# Patient Record
Sex: MALE | Race: WHITE | NOT HISPANIC OR LATINO | URBAN - METROPOLITAN AREA
[De-identification: names, ages, dates, MRNs, and addresses within clinical notes are randomized per-mention and may not be internally consistent; named-entity substitution may affect disease eponyms.]

---
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
## 2025-05-21 ENCOUNTER — APPOINTMENT (OUTPATIENT)
Dept: RADIOLOGY | Facility: MEDICAL CENTER | Age: 20
DRG: 700 | End: 2025-05-21
Attending: EMERGENCY MEDICINE
Payer: COMMERCIAL

## 2025-05-21 ENCOUNTER — OFFICE VISIT (OUTPATIENT)
Dept: URGENT CARE | Facility: PHYSICIAN GROUP | Age: 20
End: 2025-05-21
Payer: COMMERCIAL

## 2025-05-21 ENCOUNTER — HOSPITAL ENCOUNTER (INPATIENT)
Facility: MEDICAL CENTER | Age: 20
LOS: 2 days | DRG: 700 | End: 2025-05-23
Attending: EMERGENCY MEDICINE | Admitting: STUDENT IN AN ORGANIZED HEALTH CARE EDUCATION/TRAINING PROGRAM
Payer: COMMERCIAL

## 2025-05-21 VITALS
DIASTOLIC BLOOD PRESSURE: 70 MMHG | HEIGHT: 71 IN | TEMPERATURE: 99 F | OXYGEN SATURATION: 96 % | WEIGHT: 142.2 LBS | HEART RATE: 72 BPM | SYSTOLIC BLOOD PRESSURE: 118 MMHG | BODY MASS INDEX: 19.91 KG/M2 | RESPIRATION RATE: 14 BRPM

## 2025-05-21 DIAGNOSIS — T14.90XA BLUNT TRAUMA: ICD-10-CM

## 2025-05-21 DIAGNOSIS — R10.9 ACUTE LEFT FLANK PAIN: ICD-10-CM

## 2025-05-21 DIAGNOSIS — R31.0 GROSS HEMATURIA: Primary | ICD-10-CM
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
## 2025-05-21 DIAGNOSIS — M54.9 BACK PAIN DUE TO INJURY: ICD-10-CM
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
## 2025-05-21 DIAGNOSIS — R10.84 GENERALIZED ABDOMINAL PAIN: ICD-10-CM

## 2025-05-21 PROBLEM — Z53.09 CONTRAINDICATION TO DEEP VEIN THROMBOSIS (DVT) PROPHYLAXIS: Status: ACTIVE | Noted: 2025-05-21

## 2025-05-21 PROBLEM — S37.032A LACERATION OF LEFT KIDNEY: Status: ACTIVE | Noted: 2025-05-21

## 2025-05-21 LAB
ALBUMIN SERPL BCP-MCNC: 4.8 G/DL (ref 3.2–4.9)
ALBUMIN/GLOB SERPL: 1.5 G/DL
ALP SERPL-CCNC: 63 U/L (ref 30–99)
ALT SERPL-CCNC: 19 U/L (ref 2–50)
ANION GAP SERPL CALC-SCNC: 12 MMOL/L (ref 7–16)
APPEARANCE UR: CLEAR
APPEARANCE UR: NORMAL
AST SERPL-CCNC: 28 U/L (ref 12–45)
BACTERIA #/AREA URNS HPF: ABNORMAL /HPF
BASOPHILS # BLD AUTO: 0.3 % (ref 0–1.8)
BASOPHILS # BLD: 0.05 K/UL (ref 0–0.12)
BILIRUB SERPL-MCNC: 0.8 MG/DL (ref 0.1–1.5)
BILIRUB UR QL STRIP.AUTO: NEGATIVE
BILIRUB UR STRIP-MCNC: NORMAL MG/DL
BUN SERPL-MCNC: 14 MG/DL (ref 8–22)
CALCIUM ALBUM COR SERPL-MCNC: 9.4 MG/DL (ref 8.5–10.5)
CALCIUM SERPL-MCNC: 10 MG/DL (ref 8.5–10.5)
CASTS URNS QL MICRO: ABNORMAL /LPF (ref 0–2)
CHLORIDE SERPL-SCNC: 104 MMOL/L (ref 96–112)
CO2 SERPL-SCNC: 23 MMOL/L (ref 20–33)
COLOR UR AUTO: NORMAL
COLOR UR: ABNORMAL
CREAT SERPL-MCNC: 0.99 MG/DL (ref 0.5–1.4)
EOSINOPHIL # BLD AUTO: 0.01 K/UL (ref 0–0.51)
EOSINOPHIL NFR BLD: 0.1 % (ref 0–6.9)
EPITHELIAL CELLS 1715: ABNORMAL /HPF (ref 0–5)
ERYTHROCYTE [DISTWIDTH] IN BLOOD BY AUTOMATED COUNT: 36.9 FL (ref 35.9–50)
GFR SERPLBLD CREATININE-BSD FMLA CKD-EPI: 112 ML/MIN/1.73 M 2
GLOBULIN SER CALC-MCNC: 3.2 G/DL (ref 1.9–3.5)
GLUCOSE SERPL-MCNC: 105 MG/DL (ref 65–99)
GLUCOSE UR STRIP.AUTO-MCNC: NEGATIVE MG/DL
GLUCOSE UR STRIP.AUTO-MCNC: NORMAL MG/DL
HCT VFR BLD AUTO: 45.5 % (ref 42–52)
HGB BLD-MCNC: 16.1 G/DL (ref 14–18)
IMM GRANULOCYTES # BLD AUTO: 0.06 K/UL (ref 0–0.11)
IMM GRANULOCYTES NFR BLD AUTO: 0.4 % (ref 0–0.9)
KETONES UR STRIP.AUTO-MCNC: ABNORMAL MG/DL
KETONES UR STRIP.AUTO-MCNC: NORMAL MG/DL
LEUKOCYTE ESTERASE UR QL STRIP.AUTO: NEGATIVE
LEUKOCYTE ESTERASE UR QL STRIP.AUTO: NORMAL
LIPASE SERPL-CCNC: 21 U/L (ref 11–82)
LYMPHOCYTES # BLD AUTO: 0.97 K/UL (ref 1–4.8)
LYMPHOCYTES NFR BLD: 5.9 % (ref 22–41)
MCH RBC QN AUTO: 29.5 PG (ref 27–33)
MCHC RBC AUTO-ENTMCNC: 35.4 G/DL (ref 32.3–36.5)
MCV RBC AUTO: 83.3 FL (ref 81.4–97.8)
MICRO URNS: ABNORMAL
MONOCYTES # BLD AUTO: 0.67 K/UL (ref 0–0.85)
MONOCYTES NFR BLD AUTO: 4.1 % (ref 0–13.4)
NEUTROPHILS # BLD AUTO: 14.73 K/UL (ref 1.82–7.42)
NEUTROPHILS NFR BLD: 89.2 % (ref 44–72)
NITRITE UR QL STRIP.AUTO: NEGATIVE
NITRITE UR QL STRIP.AUTO: NORMAL
NRBC # BLD AUTO: 0 K/UL
NRBC BLD-RTO: 0 /100 WBC (ref 0–0.2)
PH UR STRIP.AUTO: 7 [PH] (ref 5–8)
PH UR STRIP.AUTO: NORMAL [PH] (ref 5–8)
PLATELET # BLD AUTO: 250 K/UL (ref 164–446)
PMV BLD AUTO: 8.8 FL (ref 9–12.9)
POTASSIUM SERPL-SCNC: 4.3 MMOL/L (ref 3.6–5.5)
PROT SERPL-MCNC: 8 G/DL (ref 6–8.2)
PROT UR QL STRIP: 30 MG/DL
PROT UR QL STRIP: NORMAL
RBC # BLD AUTO: 5.46 M/UL (ref 4.7–6.1)
RBC # URNS HPF: >100 /HPF (ref 0–2)
RBC UR QL AUTO: ABNORMAL
RBC UR QL AUTO: NORMAL
SODIUM SERPL-SCNC: 139 MMOL/L (ref 135–145)
SP GR UR STRIP.AUTO: >1.045
SP GR UR STRIP.AUTO: NORMAL
UROBILINOGEN UR STRIP-MCNC: NORMAL MG/DL
UROBILINOGEN UR STRIP.AUTO-MCNC: 1 EU/DL
WBC # BLD AUTO: 16.5 K/UL (ref 4.8–10.8)
WBC #/AREA URNS HPF: ABNORMAL /HPF

## 2025-05-21 PROCEDURE — 71260 CT THORAX DX C+: CPT

## 2025-05-21 PROCEDURE — 99285 EMERGENCY DEPT VISIT HI MDM: CPT

## 2025-05-21 PROCEDURE — 72125 CT NECK SPINE W/O DYE: CPT

## 2025-05-21 PROCEDURE — A9270 NON-COVERED ITEM OR SERVICE: HCPCS | Performed by: NURSE PRACTITIONER

## 2025-05-21 PROCEDURE — 3074F SYST BP LT 130 MM HG: CPT | Performed by: PHYSICIAN ASSISTANT

## 2025-05-21 PROCEDURE — 74176 CT ABD & PELVIS W/O CONTRAST: CPT

## 2025-05-21 PROCEDURE — 770001 HCHG ROOM/CARE - MED/SURG/GYN PRIV*

## 2025-05-21 PROCEDURE — 81002 URINALYSIS NONAUTO W/O SCOPE: CPT | Performed by: PHYSICIAN ASSISTANT

## 2025-05-21 PROCEDURE — 36415 COLL VENOUS BLD VENIPUNCTURE: CPT

## 2025-05-21 PROCEDURE — 80053 COMPREHEN METABOLIC PANEL: CPT

## 2025-05-21 PROCEDURE — 72131 CT LUMBAR SPINE W/O DYE: CPT

## 2025-05-21 PROCEDURE — 81001 URINALYSIS AUTO W/SCOPE: CPT

## 2025-05-21 PROCEDURE — 85025 COMPLETE CBC W/AUTO DIFF WBC: CPT

## 2025-05-21 PROCEDURE — 99214 OFFICE O/P EST MOD 30 MIN: CPT | Performed by: PHYSICIAN ASSISTANT

## 2025-05-21 PROCEDURE — 70450 CT HEAD/BRAIN W/O DYE: CPT

## 2025-05-21 PROCEDURE — 72128 CT CHEST SPINE W/O DYE: CPT

## 2025-05-21 PROCEDURE — 3078F DIAST BP <80 MM HG: CPT | Performed by: PHYSICIAN ASSISTANT

## 2025-05-21 PROCEDURE — 700102 HCHG RX REV CODE 250 W/ 637 OVERRIDE(OP): Performed by: NURSE PRACTITIONER

## 2025-05-21 PROCEDURE — 83690 ASSAY OF LIPASE: CPT

## 2025-05-21 PROCEDURE — 700117 HCHG RX CONTRAST REV CODE 255: Performed by: EMERGENCY MEDICINE

## 2025-05-21 PROCEDURE — 700105 HCHG RX REV CODE 258: Performed by: NURSE PRACTITIONER

## 2025-05-21 RX ORDER — PROCHLORPERAZINE EDISYLATE 5 MG/ML
5-10 INJECTION INTRAMUSCULAR; INTRAVENOUS EVERY 4 HOURS PRN
Status: DISCONTINUED | OUTPATIENT
Start: 2025-05-21 | End: 2025-05-23 | Stop reason: HOSPADM

## 2025-05-21 RX ORDER — AMOXICILLIN 250 MG
1 CAPSULE ORAL
Status: DISCONTINUED | OUTPATIENT
Start: 2025-05-21 | End: 2025-05-23 | Stop reason: HOSPADM

## 2025-05-21 RX ORDER — ACETAMINOPHEN 500 MG
1000 TABLET ORAL EVERY 6 HOURS PRN
Status: DISCONTINUED | OUTPATIENT
Start: 2025-05-26 | End: 2025-05-23 | Stop reason: HOSPADM

## 2025-05-21 RX ORDER — SODIUM CHLORIDE, SODIUM LACTATE, POTASSIUM CHLORIDE, CALCIUM CHLORIDE 600; 310; 30; 20 MG/100ML; MG/100ML; MG/100ML; MG/100ML
INJECTION, SOLUTION INTRAVENOUS CONTINUOUS
Status: DISCONTINUED | OUTPATIENT
Start: 2025-05-21 | End: 2025-05-23 | Stop reason: HOSPADM

## 2025-05-21 RX ORDER — ONDANSETRON 4 MG/1
4 TABLET, ORALLY DISINTEGRATING ORAL EVERY 4 HOURS PRN
Status: DISCONTINUED | OUTPATIENT
Start: 2025-05-21 | End: 2025-05-23 | Stop reason: HOSPADM

## 2025-05-21 RX ORDER — BISACODYL 10 MG
10 SUPPOSITORY, RECTAL RECTAL
Status: DISCONTINUED | OUTPATIENT
Start: 2025-05-21 | End: 2025-05-23 | Stop reason: HOSPADM

## 2025-05-21 RX ORDER — DOCUSATE SODIUM 100 MG/1
100 CAPSULE, LIQUID FILLED ORAL 2 TIMES DAILY
Status: DISCONTINUED | OUTPATIENT
Start: 2025-05-21 | End: 2025-05-22

## 2025-05-21 RX ORDER — SODIUM PHOSPHATE,MONO-DIBASIC 19G-7G/118
1 ENEMA (ML) RECTAL
Status: DISCONTINUED | OUTPATIENT
Start: 2025-05-21 | End: 2025-05-23 | Stop reason: HOSPADM

## 2025-05-21 RX ORDER — OXYCODONE HYDROCHLORIDE 5 MG/1
5 TABLET ORAL EVERY 4 HOURS PRN
Refills: 0 | Status: DISCONTINUED | OUTPATIENT
Start: 2025-05-21 | End: 2025-05-23 | Stop reason: HOSPADM

## 2025-05-21 RX ORDER — ACETAMINOPHEN 500 MG
1000 TABLET ORAL EVERY 6 HOURS
Status: DISCONTINUED | OUTPATIENT
Start: 2025-05-21 | End: 2025-05-23 | Stop reason: HOSPADM

## 2025-05-21 RX ORDER — POLYETHYLENE GLYCOL 3350 17 G/17G
1 POWDER, FOR SOLUTION ORAL 2 TIMES DAILY
Status: DISCONTINUED | OUTPATIENT
Start: 2025-05-21 | End: 2025-05-23 | Stop reason: HOSPADM

## 2025-05-21 RX ORDER — AMOXICILLIN 250 MG
1 CAPSULE ORAL NIGHTLY
Status: DISCONTINUED | OUTPATIENT
Start: 2025-05-21 | End: 2025-05-23 | Stop reason: HOSPADM

## 2025-05-21 RX ORDER — ONDANSETRON 2 MG/ML
4 INJECTION INTRAMUSCULAR; INTRAVENOUS EVERY 4 HOURS PRN
Status: DISCONTINUED | OUTPATIENT
Start: 2025-05-21 | End: 2025-05-23 | Stop reason: HOSPADM

## 2025-05-21 RX ORDER — GABAPENTIN 100 MG/1
100 CAPSULE ORAL EVERY 8 HOURS
Status: DISCONTINUED | OUTPATIENT
Start: 2025-05-21 | End: 2025-05-22

## 2025-05-21 RX ORDER — SENNOSIDES 8.6 MG
650 CAPSULE ORAL EVERY 6 HOURS PRN
COMMUNITY

## 2025-05-21 RX ADMIN — IOHEXOL 90 ML: 350 INJECTION, SOLUTION INTRAVENOUS at 18:45

## 2025-05-21 RX ADMIN — GABAPENTIN 100 MG: 100 CAPSULE ORAL at 21:08

## 2025-05-21 RX ADMIN — ACETAMINOPHEN 1000 MG: 500 TABLET ORAL at 20:55

## 2025-05-21 RX ADMIN — SODIUM CHLORIDE, POTASSIUM CHLORIDE, SODIUM LACTATE AND CALCIUM CHLORIDE: 600; 310; 30; 20 INJECTION, SOLUTION INTRAVENOUS at 20:55

## 2025-05-21 ASSESSMENT — PAIN DESCRIPTION - PAIN TYPE
TYPE: ACUTE PAIN
TYPE: ACUTE PAIN

## 2025-05-21 ASSESSMENT — COPD QUESTIONNAIRES
DURING THE PAST 4 WEEKS HOW MUCH DID YOU FEEL SHORT OF BREATH: NONE/LITTLE OF THE TIME
HAVE YOU SMOKED AT LEAST 100 CIGARETTES IN YOUR ENTIRE LIFE: YES
COPD SCREENING SCORE: 2
DO YOU EVER COUGH UP ANY MUCUS OR PHLEGM?: NO/ONLY WITH OCCASIONAL COLDS OR INFECTIONS

## 2025-05-21 ASSESSMENT — LIFESTYLE VARIABLES: DO YOU DRINK ALCOHOL: NO

## 2025-05-21 NOTE — PROGRESS NOTES
"Subjective     Paolo Meyer is a 19 y.o. male who presents with Fall (Earlier today patient was snowboarding fell and injured his lower back and has pain down his back and abdominal.)    PMH:  has a past medical history of FAS (fetal alcohol syndrome).    He has no past medical history of ASTHMA or Diabetes.  MEDS: Current Medications[1]  ALLERGIES: Allergies[2]  SURGHX: Past Surgical History[3]  SOCHX:  reports that he has never smoked. He has never used smokeless tobacco. He reports current alcohol use. He reports current drug use. Drug: Marijuana.  FH: Reviewed with patient, not pertinent to this visit.           Patient presents with complaint of left lower back, left hip and left lower abdomen pain after a significant fall while snowboarding today.  Patient states he was wearing a helmet, went off a jump and came down to a very hard landing on his left hip and back.  Patient states he was quite dazed at the time but did not lose consciousness.  Patient states he was able to snowboard down and did a few more rounds but decided to go home due to the pain.  Patient did drive himself home, patient's father has driven him to the urgent care.  Patient has gross hematuria and abdominal pain.  No other complaints.      Fall  Associated symptoms include headaches and hematuria.       Review of Systems   Genitourinary:  Positive for hematuria.   Musculoskeletal:  Positive for back pain and myalgias.   Neurological:  Positive for headaches.   All other systems reviewed and are negative.             Objective     /70   Pulse 72   Temp 37.2 °C (99 °F) (Temporal)   Resp 14   Ht 1.803 m (5' 11\")   Wt 64.5 kg (142 lb 3.2 oz)   SpO2 96%   BMI 19.83 kg/m²      Physical Exam  Vitals and nursing note reviewed.   Constitutional:       General: He is not in acute distress.     Appearance: Normal appearance. He is well-developed. He is not ill-appearing or toxic-appearing.   HENT:      Head: Normocephalic and " atraumatic.      Right Ear: Tympanic membrane normal.      Left Ear: Tympanic membrane normal.      Nose: Nose normal.      Mouth/Throat:      Lips: Pink.      Mouth: Mucous membranes are moist.      Pharynx: Oropharynx is clear. Uvula midline.   Eyes:      Extraocular Movements: Extraocular movements intact.      Conjunctiva/sclera: Conjunctivae normal.      Pupils: Pupils are equal, round, and reactive to light.   Cardiovascular:      Rate and Rhythm: Normal rate and regular rhythm.      Pulses: Normal pulses.      Heart sounds: Normal heart sounds.   Pulmonary:      Effort: Pulmonary effort is normal.      Breath sounds: Normal breath sounds.   Abdominal:      General: Bowel sounds are normal.      Palpations: Abdomen is soft.      Tenderness: There is abdominal tenderness. There is left CVA tenderness. There is no guarding or rebound.      Hernia: No hernia is present.   Musculoskeletal:         General: Normal range of motion.      Cervical back: Normal range of motion and neck supple.   Skin:     General: Skin is warm and dry.      Capillary Refill: Capillary refill takes less than 2 seconds.          Neurological:      General: No focal deficit present.      Mental Status: He is alert and oriented to person, place, and time.      Cranial Nerves: No cranial nerve deficit.      Motor: Motor function is intact.      Coordination: Coordination is intact.      Gait: Gait normal.   Psychiatric:         Mood and Affect: Mood normal.                                  Assessment & Plan  Gross hematuria    Orders:    POCT Urinalysis    Fall involving snowboard as cause of accidental injury         Generalized abdominal pain         Acute left flank pain         Back pain due to injury            Urine is gross blood.     PT requires evaluation and treatment at a facility that can provide a higher level of care due to acuity of illness/complaint.     PT father will drive him to the ER for further  evaluation.    Differential diagnosis, supportive care, and indications for immediate follow-up discussed with patient.  Instructed to return to clinic or nearest emergency department for any change in condition, further concerns, or worsening of symptoms.    I personally reviewed prior external notes and test results pertinent to today's visit.  I have independently reviewed and interpreted all diagnostics ordered during this urgent care visit.    I have contacted [facility] , spoken with RN with pt information and pending arrival to Emergency Room.       Pt and/or family verbalized understanding of diagnosis and follow up instructions and was offered informational handout on diagnosis.  PT discharged.     Please note that this dictation was created using voice recognition software. I have made every reasonable attempt to correct obvious errors, but I expect that there may be errors of grammar and possibly content that I did not discover before finalizing the note.              [1]   Current Outpatient Medications:     methocarbamol (ROBAXIN) 750 MG Tab, Take 1 Tablet by mouth every 8 hours as needed (muscle pain)., Disp: 20 Tablet, Rfl: 0    acetaminophen (TYLENOL 8 HOUR) 650 MG CR tablet, Take 650 mg by mouth every 6 hours as needed for Mild Pain or Moderate Pain., Disp: , Rfl:   [2] No Known Allergies  [3] History reviewed. No pertinent surgical history.

## 2025-05-22 LAB
ANION GAP SERPL CALC-SCNC: 12 MMOL/L (ref 7–16)
BASOPHILS # BLD AUTO: 0.4 % (ref 0–1.8)
BASOPHILS # BLD: 0.04 K/UL (ref 0–0.12)
BUN SERPL-MCNC: 14 MG/DL (ref 8–22)
CALCIUM SERPL-MCNC: 9.4 MG/DL (ref 8.5–10.5)
CHLORIDE SERPL-SCNC: 102 MMOL/L (ref 96–112)
CO2 SERPL-SCNC: 24 MMOL/L (ref 20–33)
CREAT SERPL-MCNC: 0.99 MG/DL (ref 0.5–1.4)
EOSINOPHIL # BLD AUTO: 0.07 K/UL (ref 0–0.51)
EOSINOPHIL NFR BLD: 0.6 % (ref 0–6.9)
ERYTHROCYTE [DISTWIDTH] IN BLOOD BY AUTOMATED COUNT: 38.3 FL (ref 35.9–50)
GFR SERPLBLD CREATININE-BSD FMLA CKD-EPI: 112 ML/MIN/1.73 M 2
GLUCOSE BLD STRIP.AUTO-MCNC: 91 MG/DL (ref 65–99)
GLUCOSE BLD STRIP.AUTO-MCNC: 95 MG/DL (ref 65–99)
GLUCOSE SERPL-MCNC: 99 MG/DL (ref 65–99)
HCT VFR BLD AUTO: 43.2 % (ref 42–52)
HGB BLD-MCNC: 14.8 G/DL (ref 14–18)
IMM GRANULOCYTES # BLD AUTO: 0.03 K/UL (ref 0–0.11)
IMM GRANULOCYTES NFR BLD AUTO: 0.3 % (ref 0–0.9)
LYMPHOCYTES # BLD AUTO: 2.37 K/UL (ref 1–4.8)
LYMPHOCYTES NFR BLD: 21.9 % (ref 22–41)
MAGNESIUM SERPL-MCNC: 2.4 MG/DL (ref 1.5–2.5)
MCH RBC QN AUTO: 29.3 PG (ref 27–33)
MCHC RBC AUTO-ENTMCNC: 34.3 G/DL (ref 32.3–36.5)
MCV RBC AUTO: 85.5 FL (ref 81.4–97.8)
MONOCYTES # BLD AUTO: 0.95 K/UL (ref 0–0.85)
MONOCYTES NFR BLD AUTO: 8.8 % (ref 0–13.4)
NEUTROPHILS # BLD AUTO: 7.35 K/UL (ref 1.82–7.42)
NEUTROPHILS NFR BLD: 68 % (ref 44–72)
NRBC # BLD AUTO: 0 K/UL
NRBC BLD-RTO: 0 /100 WBC (ref 0–0.2)
PHOSPHATE SERPL-MCNC: 4 MG/DL (ref 2.5–4.5)
PLATELET # BLD AUTO: 244 K/UL (ref 164–446)
PMV BLD AUTO: 9.3 FL (ref 9–12.9)
POTASSIUM SERPL-SCNC: 3.6 MMOL/L (ref 3.6–5.5)
RBC # BLD AUTO: 5.05 M/UL (ref 4.7–6.1)
SODIUM SERPL-SCNC: 138 MMOL/L (ref 135–145)
WBC # BLD AUTO: 10.8 K/UL (ref 4.8–10.8)

## 2025-05-22 PROCEDURE — 97161 PT EVAL LOW COMPLEX 20 MIN: CPT

## 2025-05-22 PROCEDURE — 85025 COMPLETE CBC W/AUTO DIFF WBC: CPT

## 2025-05-22 PROCEDURE — 84100 ASSAY OF PHOSPHORUS: CPT

## 2025-05-22 PROCEDURE — 83735 ASSAY OF MAGNESIUM: CPT

## 2025-05-22 PROCEDURE — A9270 NON-COVERED ITEM OR SERVICE: HCPCS | Performed by: NURSE PRACTITIONER

## 2025-05-22 PROCEDURE — 700102 HCHG RX REV CODE 250 W/ 637 OVERRIDE(OP): Performed by: NURSE PRACTITIONER

## 2025-05-22 PROCEDURE — 82962 GLUCOSE BLOOD TEST: CPT

## 2025-05-22 PROCEDURE — 97535 SELF CARE MNGMENT TRAINING: CPT

## 2025-05-22 PROCEDURE — 51798 US URINE CAPACITY MEASURE: CPT

## 2025-05-22 PROCEDURE — 80048 BASIC METABOLIC PNL TOTAL CA: CPT

## 2025-05-22 PROCEDURE — 770001 HCHG ROOM/CARE - MED/SURG/GYN PRIV*

## 2025-05-22 RX ORDER — METAXALONE 800 MG/1
800 TABLET ORAL 3 TIMES DAILY
Status: DISCONTINUED | OUTPATIENT
Start: 2025-05-22 | End: 2025-05-23 | Stop reason: HOSPADM

## 2025-05-22 RX ADMIN — ACETAMINOPHEN 1000 MG: 500 TABLET ORAL at 05:53

## 2025-05-22 RX ADMIN — ACETAMINOPHEN 1000 MG: 500 TABLET ORAL at 11:35

## 2025-05-22 RX ADMIN — ACETAMINOPHEN 1000 MG: 500 TABLET ORAL at 00:14

## 2025-05-22 RX ADMIN — METAXALONE 800 MG: 800 TABLET ORAL at 11:35

## 2025-05-22 RX ADMIN — METAXALONE 800 MG: 800 TABLET ORAL at 18:01

## 2025-05-22 RX ADMIN — OXYCODONE 5 MG: 5 TABLET ORAL at 18:11

## 2025-05-22 SDOH — ECONOMIC STABILITY: TRANSPORTATION INSECURITY
IN THE PAST 12 MONTHS, HAS THE LACK OF TRANSPORTATION KEPT YOU FROM MEDICAL APPOINTMENTS OR FROM GETTING MEDICATIONS?: NO

## 2025-05-22 SDOH — ECONOMIC STABILITY: TRANSPORTATION INSECURITY
IN THE PAST 12 MONTHS, HAS LACK OF RELIABLE TRANSPORTATION KEPT YOU FROM MEDICAL APPOINTMENTS, MEETINGS, WORK OR FROM GETTING THINGS NEEDED FOR DAILY LIVING?: NO

## 2025-05-22 ASSESSMENT — COGNITIVE AND FUNCTIONAL STATUS - GENERAL
DAILY ACTIVITIY SCORE: 24
MOBILITY SCORE: 18
MOVING TO AND FROM BED TO CHAIR: A LITTLE
TURNING FROM BACK TO SIDE WHILE IN FLAT BAD: A LITTLE
MOVING FROM LYING ON BACK TO SITTING ON SIDE OF FLAT BED: A LITTLE
SUGGESTED CMS G CODE MODIFIER MOBILITY: CH
WALKING IN HOSPITAL ROOM: A LITTLE
SUGGESTED CMS G CODE MODIFIER DAILY ACTIVITY: CH
CLIMB 3 TO 5 STEPS WITH RAILING: A LITTLE
MOBILITY SCORE: 24
STANDING UP FROM CHAIR USING ARMS: A LITTLE
SUGGESTED CMS G CODE MODIFIER MOBILITY: CK

## 2025-05-22 ASSESSMENT — PATIENT HEALTH QUESTIONNAIRE - PHQ9
2. FEELING DOWN, DEPRESSED, IRRITABLE, OR HOPELESS: NOT AT ALL
SUM OF ALL RESPONSES TO PHQ9 QUESTIONS 1 AND 2: 0
1. LITTLE INTEREST OR PLEASURE IN DOING THINGS: NOT AT ALL

## 2025-05-22 ASSESSMENT — ENCOUNTER SYMPTOMS
CARDIOVASCULAR NEGATIVE: 1
VOMITING: 0
RESPIRATORY NEGATIVE: 1
NAUSEA: 0
GASTROINTESTINAL NEGATIVE: 1

## 2025-05-22 ASSESSMENT — LIFESTYLE VARIABLES
HAVE YOU EVER FELT YOU SHOULD CUT DOWN ON YOUR DRINKING: NO
TOTAL SCORE: 0
ON A TYPICAL DAY WHEN YOU DRINK ALCOHOL HOW MANY DRINKS DO YOU HAVE: 0
HAVE PEOPLE ANNOYED YOU BY CRITICIZING YOUR DRINKING: NO
AVERAGE NUMBER OF DAYS PER WEEK YOU HAVE A DRINK CONTAINING ALCOHOL: 0
ALCOHOL_USE: NO
DOES PATIENT WANT TO STOP DRINKING: NO
TOTAL SCORE: 0
EVER HAD A DRINK FIRST THING IN THE MORNING TO STEADY YOUR NERVES TO GET RID OF A HANGOVER: NO
TOTAL SCORE: 0
CONSUMPTION TOTAL: NEGATIVE
EVER FELT BAD OR GUILTY ABOUT YOUR DRINKING: NO
HOW MANY TIMES IN THE PAST YEAR HAVE YOU HAD 5 OR MORE DRINKS IN A DAY: 0

## 2025-05-22 ASSESSMENT — SOCIAL DETERMINANTS OF HEALTH (SDOH)
WITHIN THE PAST 12 MONTHS, YOU WORRIED THAT YOUR FOOD WOULD RUN OUT BEFORE YOU GOT THE MONEY TO BUY MORE: NEVER TRUE
IN THE PAST 12 MONTHS, HAS THE ELECTRIC, GAS, OIL, OR WATER COMPANY THREATENED TO SHUT OFF SERVICE IN YOUR HOME?: NO
WITHIN THE PAST 12 MONTHS, THE FOOD YOU BOUGHT JUST DIDN'T LAST AND YOU DIDN'T HAVE MONEY TO GET MORE: NEVER TRUE
IN THE PAST 12 MONTHS, HAS THE ELECTRIC, GAS, OIL, OR WATER COMPANY THREATENED TO SHUT OFF SERVICE IN YOUR HOME?: NO
WITHIN THE PAST 12 MONTHS, THE FOOD YOU BOUGHT JUST DIDN'T LAST AND YOU DIDN'T HAVE MONEY TO GET MORE: NEVER TRUE
WITHIN THE LAST YEAR, HAVE YOU BEEN AFRAID OF YOUR PARTNER OR EX-PARTNER?: NO
WITHIN THE LAST YEAR, HAVE TO BEEN RAPED OR FORCED TO HAVE ANY KIND OF SEXUAL ACTIVITY BY YOUR PARTNER OR EX-PARTNER?: NO
WITHIN THE LAST YEAR, HAVE YOU BEEN HUMILIATED OR EMOTIONALLY ABUSED IN OTHER WAYS BY YOUR PARTNER OR EX-PARTNER?: NO
WITHIN THE LAST YEAR, HAVE YOU BEEN KICKED, HIT, SLAPPED, OR OTHERWISE PHYSICALLY HURT BY YOUR PARTNER OR EX-PARTNER?: NO

## 2025-05-22 ASSESSMENT — GAIT ASSESSMENTS
DEVIATION: BRADYKINETIC
DISTANCE (FEET): 500
GAIT LEVEL OF ASSIST: SUPERVISED

## 2025-05-22 ASSESSMENT — PAIN DESCRIPTION - PAIN TYPE
TYPE: ACUTE PAIN

## 2025-05-22 ASSESSMENT — PAIN SCALES - WONG BAKER: WONGBAKER_NUMERICALRESPONSE: HURTS JUST A LITTLE BIT

## 2025-05-22 NOTE — ED NOTES
Pt sitting up in bed eating food provided by his parents, per admission orders, and tolerating well.

## 2025-05-22 NOTE — ED TRIAGE NOTES
"Chief Complaint   Patient presents with    T-5000     Was snowboarding and landed on L side, endorsing increased flank pain radiating to LLQ. Reports blood started in urine after fall, described as dark red. Denies dysuria. Redness noticed to L hip area but no visible bruising to flank or abdomen. Endorsing worsening pain w/ palpation.      /78   Pulse 73   Temp 36.9 °C (98.4 °F) (Temporal)   Resp 14   Ht 1.803 m (5' 11\")   Wt 64.6 kg (142 lb 6.7 oz)   SpO2 100%   BMI 19.86 kg/m²     Pt ambulatory to triage w/ above complaint.   Denies any medical history. Abdominal protocol ordered. Charge RN notified pt to blue 13H.     "

## 2025-05-22 NOTE — ASSESSMENT & PLAN NOTE
VTE prophylaxis initially contraindicated secondary to elevated bleeding risk.  5/23 Trauma surveillance venous duplex ultrasonography ordered.

## 2025-05-22 NOTE — CARE PLAN
The patient is Stable - Low risk of patient condition declining or worsening    Shift Goals  Clinical Goals: q4 bladder scan, monitor urine output, pain control  Patient Goals: rest, comfort  Family Goals: not present      Problem: Pain - Standard  Goal: Alleviation of pain or a reduction in pain to the patient’s comfort goal  Outcome: Progressing     Problem: Knowledge Deficit - Standard  Goal: Patient and family/care givers will demonstrate understanding of plan of care, disease process/condition, diagnostic tests and medications  Outcome: Progressing     Problem: Fluid Volume:  Goal: Will maintain balanced intake and output  Outcome: Progressing

## 2025-05-22 NOTE — ED PROVIDER NOTES
"ED Provider Note    CHIEF COMPLAINT  Chief Complaint   Patient presents with    T-5000     Was snowboarding and landed on L side, endorsing increased flank pain radiating to LLQ. Reports blood started in urine after fall, described as dark red. Denies dysuria. Redness noticed to L hip area but no visible bruising to flank or abdomen. Endorsing worsening pain w/ palpation.        EXTERNAL RECORDS REVIEWED  Outpatient Notes urgent care note from earlier today    HPI/ROS  LIMITATION TO HISTORY   Select: : None  OUTSIDE HISTORIAN(S):  none    Paolo Meyer is a 19 y.o. male who presents to the emergency department after a snowboarding accident.  States that he was at [employer] snowboarding earlier today.  Went off one of their jumps and believes he got roughly 15 feet of air but then lost his balance then ultimately landed directly on his back/left side.  Was helmeted.  Denies full loss conscious but said he was \"dazed \".  Continue to ride for the rest of the day but now this afternoon has had progressively worsening left back and flank pain.    PAST MEDICAL HISTORY   has a past medical history of FAS (fetal alcohol syndrome).    SURGICAL HISTORY  patient denies any surgical history    FAMILY HISTORY  Family History   Problem Relation Age of Onset    No Known Problems Mother     No Known Problems Father     Cancer Maternal Grandmother         uterine       SOCIAL HISTORY  Social History     Tobacco Use    Smoking status: Never    Smokeless tobacco: Never   Vaping Use    Vaping status: Every Day    Substances: Nicotine, THC   Substance and Sexual Activity    Alcohol use: Yes     Comment: occ    Drug use: Yes     Types: Marijuana    Sexual activity: Never       CURRENT MEDICATIONS  Home Medications       Reviewed by Asiya Huynh R.N. (Registered Nurse) on 05/21/25 at 1715  Med List Status: Partial     Medication Last Dose Status   acetaminophen (TYLENOL) 120 MG Suppos  Active   ibuprofen (MOTRIN) 200 MG Tab  " "Active                  Audit from Redirected Encounters    **Home medications have not yet been reviewed for this encounter**         ALLERGIES  Allergies[1]    PHYSICAL EXAM  VITAL SIGNS: /69   Pulse 68   Temp 36.9 °C (98.4 °F) (Temporal)   Resp 16   Ht 1.803 m (5' 11\")   Wt 64.6 kg (142 lb 6.7 oz)   SpO2 97%   BMI 19.86 kg/m²      Pulse ox interpretation: I interpret this pulse ox as normal.  Constitutional: Alert in no apparent distress.  HENT: No signs of trauma, Bilateral external ears normal, Nose normal.   Eyes: Pupils are equal and reactive  Neck: Normal range of motion, No tenderness, Supple  Cardiovascular: Regular rate and rhythm, no murmurs.   Thorax & Lungs: Normal breath sounds, No respiratory distress, mild left lateral chest wall tenderness.  Abdomen: Bowel sounds normal, Soft, left flank tenderness.  No bruising.  Skin: Warm, Dry, No erythema, No rash.   Back: No bony tenderness  Musculoskeletal: Good range of motion in all major joints. No tenderness to palpation or major deformities noted.   Neurologic: Alert , Normal motor function, Normal sensory function, No focal deficits noted.   Psychiatric: Affect normal, Judgment normal, Mood normal.         EKG/LABS  Results for orders placed or performed during the hospital encounter of 05/21/25   CBC WITH DIFFERENTIAL    Collection Time: 05/21/25  5:38 PM   Result Value Ref Range    WBC 16.5 (H) 4.8 - 10.8 K/uL    RBC 5.46 4.70 - 6.10 M/uL    Hemoglobin 16.1 14.0 - 18.0 g/dL    Hematocrit 45.5 42.0 - 52.0 %    MCV 83.3 81.4 - 97.8 fL    MCH 29.5 27.0 - 33.0 pg    MCHC 35.4 32.3 - 36.5 g/dL    RDW 36.9 35.9 - 50.0 fL    Platelet Count 250 164 - 446 K/uL    MPV 8.8 (L) 9.0 - 12.9 fL    Neutrophils-Polys 89.20 (H) 44.00 - 72.00 %    Lymphocytes 5.90 (L) 22.00 - 41.00 %    Monocytes 4.10 0.00 - 13.40 %    Eosinophils 0.10 0.00 - 6.90 %    Basophils 0.30 0.00 - 1.80 %    Immature Granulocytes 0.40 0.00 - 0.90 %    Nucleated RBC 0.00 0.00 - 0.20 " /100 WBC    Neutrophils (Absolute) 14.73 (H) 1.82 - 7.42 K/uL    Lymphs (Absolute) 0.97 (L) 1.00 - 4.80 K/uL    Monos (Absolute) 0.67 0.00 - 0.85 K/uL    Eos (Absolute) 0.01 0.00 - 0.51 K/uL    Baso (Absolute) 0.05 0.00 - 0.12 K/uL    Immature Granulocytes (abs) 0.06 0.00 - 0.11 K/uL    NRBC (Absolute) 0.00 K/uL   COMP METABOLIC PANEL    Collection Time: 05/21/25  5:38 PM   Result Value Ref Range    Sodium 139 135 - 145 mmol/L    Potassium 4.3 3.6 - 5.5 mmol/L    Chloride 104 96 - 112 mmol/L    Co2 23 20 - 33 mmol/L    Anion Gap 12.0 7.0 - 16.0    Glucose 105 (H) 65 - 99 mg/dL    Bun 14 8 - 22 mg/dL    Creatinine 0.99 0.50 - 1.40 mg/dL    Calcium 10.0 8.5 - 10.5 mg/dL    Correct Calcium 9.4 8.5 - 10.5 mg/dL    AST(SGOT) 28 12 - 45 U/L    ALT(SGPT) 19 2 - 50 U/L    Alkaline Phosphatase 63 30 - 99 U/L    Total Bilirubin 0.8 0.1 - 1.5 mg/dL    Albumin 4.8 3.2 - 4.9 g/dL    Total Protein 8.0 6.0 - 8.2 g/dL    Globulin 3.2 1.9 - 3.5 g/dL    A-G Ratio 1.5 g/dL   LIPASE    Collection Time: 05/21/25  5:38 PM   Result Value Ref Range    Lipase 21 11 - 82 U/L   ESTIMATED GFR    Collection Time: 05/21/25  5:38 PM   Result Value Ref Range    GFR (CKD-EPI) 112 >60 mL/min/1.73 m 2   URINALYSIS    Collection Time: 05/21/25  7:22 PM    Specimen: Urine   Result Value Ref Range    Micro Urine Req Microscopic        I have independently interpreted this EKG    RADIOLOGY/PROCEDURES   I have independently interpreted the diagnostic imaging associated with this visit and am waiting the final reading from the radiologist.   My preliminary interpretation is as follows: No large intracranial hemorrhage    Radiologist interpretation:  CT-ABDOMEN-PELVIS W/O   Final Result      1.  No evidence of bladder injury.   2.  LEFT kidney contusion, not apparent on prior imaging.  Minimal capsular contrast suggests laceration although renal parenchyma is difficult to assess on delayed phase study.   3.  Unremarkable RIGHT kidney.          CT-CHEST,ABDOMEN,PELVIS WITH   Final Result      No acute posttraumatic imaging findings in the chest, abdomen or pelvis.      CT-LSPINE W/O PLUS RECONS   Final Result      CT of the lumbar spine without contrast within normal limits.      CT-TSPINE W/O PLUS RECONS   Final Result      CT of the thoracic spine without contrast within normal limits.      CT-CSPINE WITHOUT PLUS RECONS   Final Result      CT of the cervical spine without contrast within normal limits.      CT-HEAD W/O   Final Result      No acute intracranial abnormality.                      COURSE & MEDICAL DECISION MAKING    ASSESSMENT, COURSE AND PLAN  Care Narrative: Patient presenting after moderate fall while snowboarding.  Roughly 10 to 15 foot fall.  Will complete trauma workup at this time to include labs and imaging    DISPOSITION AND DISCUSSIONS  I have discussed management of the patient with the following physicians and NOAM's: I spoke with urology Dr. Gamboa after initial trauma CT has been completed.  He asked that second repeat CT be completed to reevaluate renal and urologic structures.  Repeat CT did show likely evidence of renal laceration.  Dr. Gamboa was reconsulted and asked for trauma to evaluate.    Trauma services Dr. Suggs aware of mechanism and pathology and will admit for overnight observation.    19-year-old presenting after snowboarding fall.  Workup and consults as above.  Patient   Will be admitted under trauma services at this time    FINAL DIAGNOSIS  1. Gross hematuria    2. Blunt trauma    3. Snowboard accident, initial encounter         Electronically signed by: Mitchell Calderon M.D., 5/21/2025 5:26 PM           [1] No Known Allergies

## 2025-05-22 NOTE — PROGRESS NOTES
4 Eyes Skin Assessment Completed by MARINA Mancini and MARINA Gibson.    Head WDL  Ears WDL  Nose WDL  Mouth WDL  Neck WDL  Breast/Chest WDL  Shoulder Blades WDL  Spine redness/blanching  (R) Arm/Elbow/Hand Redness and Blanching  (L) Arm/Elbow/Hand Redness and Blanching  Abdomen Redness with slight swelling to L posterior flank  Groin WDL  Scrotum/Coccyx/Buttocks WDL  (R) Leg Scar  (L) Leg Scar  (R) Heel/Foot/Toe WDL  (L) Heel/Foot/Toe WDL          Devices In Places Pulse Ox, PIV      Interventions In Place TAP System, Pillows, Heels Loaded W/Pillows, and Pressure Redistribution Mattress    Possible Skin Injury No    Pictures Uploaded Into Epic N/A  Wound Consult Placed N/A  RN Wound Prevention Protocol Ordered No

## 2025-05-22 NOTE — ED NOTES
Report given to Pamela MORRIS RN. At this time pt is dozing in bed in view of RN station with even and unlabored breaths with parents at bedside. No distress noted. Pt and parents also updated regarding bed assignment upstairs and pending transfer. his RN removed from care.

## 2025-05-22 NOTE — THERAPY
Occupational Therapy Contact Note    Patient Name: Paolo Meyer  Age:  19 y.o., Sex:  male  Medical Record #: [id number]  Today's Date: 5/22/2025    OT orders received. Pt's ADL 6 click score 24/24, pt is ambulating the halls and RN reported pt doesn't have OT needs. Met with pt to discuss role of OT and assess for any needs. Pt declined any concern or difficulty with his ADLs. No acute OT needs indicated at this time. Will complete order.    Nora Claire, OTR/L, CNS

## 2025-05-22 NOTE — CARE PLAN
The patient is Stable - Low risk of patient condition declining or worsening    Shift Goals  Clinical Goals: safety, pain control, rest, ambulation, urination  Patient Goals: rest, comfort  Family Goals: updates    Progress made toward(s) clinical / shift goals: Pt medicated per MAR for pain, educated on the different modalities of pain management. Pt encourage ambulation as tolerated. Pt encouraged to use restroom Q4 hrs. Urine was yellow in color. Pt running IV fluids per MAR. Updated on care plan.      Problem: Pain - Standard  Goal: Alleviation of pain or a reduction in pain to the patient’s comfort goal  Outcome: Progressing     Problem: Knowledge Deficit - Standard  Goal: Patient and family/care givers will demonstrate understanding of plan of care, disease process/condition, diagnostic tests and medications  Outcome: Progressing     Problem: Fluid Volume:  Goal: Will maintain balanced intake and output  Outcome: Progressing

## 2025-05-22 NOTE — ED NOTES
Received report from MARINA Cornejo. Upon shift change pt is resting in bed with even and unlabored breaths, in no apparent distress. Pt is on room air, tolerating well. Family x2 at bedside. Will continue to monitor.
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
## 2025-05-22 NOTE — ED NOTES
Med Rec completed per patient and mother at bedside      Allergies reviewed: yes     Oral antibiotics in the past 30 days: no    Anticoagulant in past 14 days: no    Dispense history available in EPIC: no    Pharmacy patient utilizes: [facility]  [phone]

## 2025-05-22 NOTE — PROGRESS NOTES
Assumed care of patient and received report from MARINA Mancini. Assessment completed.Pt A&Ox 4. Respirations are even and unlabored on room air. Pt denies pain at this time. Medical pt, call light and belongings are within reach. POC updated. Pt educated on room and call light, pt verbalized understanding. Needs met.

## 2025-05-22 NOTE — PROGRESS NOTES
Late entry  Approx 2230: Pt arrived to the unit via hospital transport with all belongings and parents at bedside. Report received from ED to Floor RN report. Pt able to ambulate without assistance from the gurney to bed. Educated that staff needs to be present for mobilization. Pt resting comfortably at bedside.  Assessment complete.  A&Ox4. Denies CP/SOB.  Reporting 2/10 pain. Declined intervention at this time.  Educated patient regarding pharmacologic and non pharmacologic modalities for pain management.  Skin: see 2RN skin note  Tolerating regular diet. Denies N/V.  + void. Last BM PTA  Pt ambulates x1 SBA, low fall risk per Filomena Tong, frame alarm on.  All needs met at this time. Call light within reach. Pt calls appropriately. Bed low and locked, non skid socks in place. Hourly rounding in place.
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
## 2025-05-22 NOTE — ASSESSMENT & PLAN NOTE
Unable to grade based on sub optimal contrast timing, likely grade 1  Hematuria.  CT with left kidney contusion. Minimal capsular contrast suggests laceration.  Q 4 hr urinary output or bladder scan.   Monitor for urinary retention.
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
## 2025-05-23 ENCOUNTER — PHARMACY VISIT (OUTPATIENT)
Dept: PHARMACY | Facility: MEDICAL CENTER | Age: 20
End: 2025-05-23
Payer: MEDICARE

## 2025-05-23 VITALS
BODY MASS INDEX: 19.94 KG/M2 | RESPIRATION RATE: 17 BRPM | DIASTOLIC BLOOD PRESSURE: 72 MMHG | HEIGHT: 71 IN | WEIGHT: 142.42 LBS | HEART RATE: 70 BPM | TEMPERATURE: 97.9 F | SYSTOLIC BLOOD PRESSURE: 113 MMHG | OXYGEN SATURATION: 96 %

## 2025-05-23 LAB
ANION GAP SERPL CALC-SCNC: 11 MMOL/L (ref 7–16)
BASOPHILS # BLD AUTO: 0.4 % (ref 0–1.8)
BASOPHILS # BLD: 0.04 K/UL (ref 0–0.12)
BUN SERPL-MCNC: 10 MG/DL (ref 8–22)
CALCIUM SERPL-MCNC: 9.3 MG/DL (ref 8.5–10.5)
CHLORIDE SERPL-SCNC: 102 MMOL/L (ref 96–112)
CO2 SERPL-SCNC: 26 MMOL/L (ref 20–33)
CREAT SERPL-MCNC: 0.92 MG/DL (ref 0.5–1.4)
EOSINOPHIL # BLD AUTO: 0.08 K/UL (ref 0–0.51)
EOSINOPHIL NFR BLD: 0.8 % (ref 0–6.9)
ERYTHROCYTE [DISTWIDTH] IN BLOOD BY AUTOMATED COUNT: 37.7 FL (ref 35.9–50)
GFR SERPLBLD CREATININE-BSD FMLA CKD-EPI: 123 ML/MIN/1.73 M 2
GLUCOSE SERPL-MCNC: 89 MG/DL (ref 65–99)
HCT VFR BLD AUTO: 42.2 % (ref 42–52)
HGB BLD-MCNC: 14.9 G/DL (ref 14–18)
IMM GRANULOCYTES # BLD AUTO: 0.05 K/UL (ref 0–0.11)
IMM GRANULOCYTES NFR BLD AUTO: 0.5 % (ref 0–0.9)
LYMPHOCYTES # BLD AUTO: 2.09 K/UL (ref 1–4.8)
LYMPHOCYTES NFR BLD: 21 % (ref 22–41)
MCH RBC QN AUTO: 30 PG (ref 27–33)
MCHC RBC AUTO-ENTMCNC: 35.3 G/DL (ref 32.3–36.5)
MCV RBC AUTO: 84.9 FL (ref 81.4–97.8)
MONOCYTES # BLD AUTO: 0.75 K/UL (ref 0–0.85)
MONOCYTES NFR BLD AUTO: 7.6 % (ref 0–13.4)
NEUTROPHILS # BLD AUTO: 6.92 K/UL (ref 1.82–7.42)
NEUTROPHILS NFR BLD: 69.7 % (ref 44–72)
NRBC # BLD AUTO: 0 K/UL
NRBC BLD-RTO: 0 /100 WBC (ref 0–0.2)
PLATELET # BLD AUTO: 208 K/UL (ref 164–446)
PMV BLD AUTO: 9.1 FL (ref 9–12.9)
POTASSIUM SERPL-SCNC: 4 MMOL/L (ref 3.6–5.5)
RBC # BLD AUTO: 4.97 M/UL (ref 4.7–6.1)
SODIUM SERPL-SCNC: 139 MMOL/L (ref 135–145)
WBC # BLD AUTO: 9.9 K/UL (ref 4.8–10.8)

## 2025-05-23 PROCEDURE — 85025 COMPLETE CBC W/AUTO DIFF WBC: CPT

## 2025-05-23 PROCEDURE — 80048 BASIC METABOLIC PNL TOTAL CA: CPT

## 2025-05-23 PROCEDURE — 700102 HCHG RX REV CODE 250 W/ 637 OVERRIDE(OP): Performed by: NURSE PRACTITIONER

## 2025-05-23 PROCEDURE — A9270 NON-COVERED ITEM OR SERVICE: HCPCS | Performed by: NURSE PRACTITIONER

## 2025-05-23 PROCEDURE — RXMED WILLOW AMBULATORY MEDICATION CHARGE: Performed by: NURSE PRACTITIONER

## 2025-05-23 PROCEDURE — 700105 HCHG RX REV CODE 258: Performed by: NURSE PRACTITIONER

## 2025-05-23 RX ORDER — METHOCARBAMOL 750 MG/1
750 TABLET, FILM COATED ORAL EVERY 8 HOURS PRN
Qty: 20 TABLET | Refills: 0 | Status: SHIPPED | OUTPATIENT
Start: 2025-05-23

## 2025-05-23 RX ADMIN — ACETAMINOPHEN 1000 MG: 500 TABLET ORAL at 00:30

## 2025-05-23 RX ADMIN — SODIUM CHLORIDE, POTASSIUM CHLORIDE, SODIUM LACTATE AND CALCIUM CHLORIDE: 600; 310; 30; 20 INJECTION, SOLUTION INTRAVENOUS at 00:33

## 2025-05-23 RX ADMIN — METAXALONE 800 MG: 800 TABLET ORAL at 04:01

## 2025-05-23 RX ADMIN — ACETAMINOPHEN 1000 MG: 500 TABLET ORAL at 04:01

## 2025-05-23 ASSESSMENT — PAIN DESCRIPTION - PAIN TYPE
TYPE: ACUTE PAIN

## 2025-05-23 NOTE — CARE PLAN
The patient is Stable - Low risk of patient condition declining or worsening    Shift Goals  Clinical Goals: safety, rest, q 4 bladder scans  Patient Goals: rest, comfort  Family Goals: na      Problem: Pain - Standard  Goal: Alleviation of pain or a reduction in pain to the patient’s comfort goal  Outcome: Progressing   Educated patient on fall safety while in hospital and precautions that are used to prevent falls while recovering from surgery.   Patient verbalized understanding of education.   Interventions:   - using call light before getting up   - bed alarm in place    - bed in lowest and locked position    - using correct DME and putting away after use   - non skid socks and yellow arm band in place    - fall sign outside door   - white board updated during shift change and any patient     Problem: Knowledge Deficit - Standard  Goal: Patient and family/care givers will demonstrate understanding of plan of care, disease process/condition, diagnostic tests and medications  Outcome: Progressing   Educated pt on monitoring intake and output of fluids to monitor urinary retention after injury. Pt verbalized understanding of education.

## 2025-05-23 NOTE — PROGRESS NOTES
Discharge orders received.  Patient arrived to the discharge lounge.  PIV removed by discharge RN. Meds to beds medications verified by discharge RN, bag with medication given to patient.  Instructions given, medications reviewed and general discharge education provided to patient.  Follow up appointments discussed. Patient educated on signs and symptoms of infection, including: fever, site redness/swelling/warmth/tenderness/foul smelling drainage. If signs and symptoms of infection are noted patient to call 911 or come back to emergency room. Patient verbalized understanding of dc instructions and prescriptions.  Patient signed discharge instructions.  Patient verbalized he had all belongings with him, Denied having any home medications locked in our inpatient pharmacy that  he needs back. Patient ambulated with steady gait from discharge lounge to private vehicle. Patient left via car with mom to home in stable condition.

## 2025-05-23 NOTE — DISCHARGE SUMMARY
Trauma Discharge Summary    DATE OF ADMISSION: 5/21/2025    DATE OF DISCHARGE: 5/23/2025    LENGTH OF STAY: 2 days    ATTENDING PHYSICIAN: Sony Suggs M.D.    CONSULTING PHYSICIAN:   1.   2.     DISCHARGE DIAGNOSIS:  Principal Problem:    Trauma  Active Problems:    Laceration of left kidney    Contraindication to deep vein thrombosis (DVT) prophylaxis  Resolved Problems:    * No resolved hospital problems. *      PROCEDURES:  1.    2.      HISTORY OF PRESENT ILLNESS: The patient is a 19 y.o. male who was reportedly injured in a snowboarding injury.  He was transferred to [facility] in [place], [place].     HOSPITAL COURSE: The patient was triaged as a partial trauma activation. The patient was transported to orthopedic jonas where he underwent frequent monitoring of his input and output.  He was monitored for any signs of hematuria and bladder retention.  After 24 hours patient's hemoglobin remained stable and he was discharged home with family at bedside.    HOSPITAL PROBLEM LIST:  * Trauma- (present on admission)  Assessment & Plan  Snowboarding crash.  T-5000 Activation.  Surgeon List: Sony Suggs MD. Trauma Surgery    Laceration of left kidney- (present on admission)  Assessment & Plan  Unable to grade based on sub optimal contrast timing, likely grade 1  Hematuria.  CT with left kidney contusion. Minimal capsular contrast suggests laceration.  Q 4 hr urinary output or bladder scan.   Monitor for urinary retention.     Contraindication to deep vein thrombosis (DVT) prophylaxis- (present on admission)  Assessment & Plan  VTE prophylaxis initially contraindicated secondary to elevated bleeding risk.  5/23 Trauma surveillance venous duplex ultrasonography ordered.          DISPOSITION: Discharged home on 5/23/25. The patient and family were counseled and questions were answered. Specifically, signs and symptoms of infection, respiratory decompensation, and persistent or worsening pain  were discussed and the patient agrees to seek medical attention if any of these develop.    DISCHARGE MEDICATIONS:  The patients controlled substance history was reviewed and a controlled substance use informed consent (if applicable) was provided by [facility] and the patient has been prescribed.     Medication List        START taking these medications        Instructions   methocarbamol 750 MG Tabs  Commonly known as: Robaxin   Take 1 Tablet by mouth every 8 hours as needed (muscle pain).  Dose: 750 mg            CONTINUE taking these medications        Instructions   Tylenol 8 Hour 650 MG CR tablet  Generic drug: acetaminophen   Take 650 mg by mouth every 6 hours as needed for Mild Pain or Moderate Pain.  Dose: 650 mg              ACTIVITY:  Avoid anything that causes more increase in blunt trauma injury.  No snowboarding or contact sports.    WOUND CARE:  N/A    DIET:  No orders of the defined types were placed in this encounter.      FOLLOW UP:  Rohan Gamboa M.D.  [address]  [address]  [place] [address]  [phone]    Schedule an appointment as soon as possible for a visit      Patrick Trinidad M.D.  [address]  [address]  [facility] [address]  [phone]    Schedule an appointment as soon as possible for a visit  As needed      TIME SPENT ON DISCHARGE: 32 minutes      ____________________________________________  JOAO Paulino    DD: 5/23/2025 9:01 AM  3

## 2025-05-23 NOTE — CARE PLAN
Problem: Knowledge Deficit - Standard  Goal: Patient and family/care givers will demonstrate understanding of plan of care, disease process/condition, diagnostic tests and medications  Outcome: Progressing     Problem: Fluid Volume:  Goal: Will maintain balanced intake and output  Outcome: Progressing       The patient is Stable - Low risk of patient condition declining or worsening    Shift Goals  Clinical Goals: Bladder scans, I&O  Patient Goals: Comfort  Family Goals: na    Progress made toward(s) clinical / shift goals:  Bladder scans every 4 hours per orders. Monitoring intake and output.     Patient is not progressing towards the following goals:

## 2025-05-23 NOTE — DISCHARGE INSTRUCTIONS
- Call or seek medical attention for questions or concerns  - Follow up with Dr Gamboa  in 1-2 weeks time. Call their private office to schedule follow up.   - Follow up with primary care provider within one weeks time. Review your home and any new medication with your primary care doctor.    - Resume regular diet  - May take over the counter acetaminophen.  Avoid NSAIDS, Aspirin, Ibuprofen for 7 days post injury.   - May use OTC 4% lidocaine patches.  - Continue daily over the counter stool softener while on narcotics  - No operation of machinery or motorized vehicles while under the influence of narcotics  - No alcohol, marijuana or illicit drug use while under the influence of narcotics  - In the event of a narcotic overdose naloxone (Narcan) is available without a prescription from any [facility] or [facility]  - Hospital staff are unable to refill your pain medication. You will need to contact your PCP or surgeon's office for refills  - No swimming, hot tubs, baths or wound submersion until cleared by outpatient provider. May shower  - No contact sports, strenuous activities, or heavy lifting until cleared by outpatient provider  - If respiratory decompensation, persistent or worsening pain, fever or signs or symptoms of infection occur seek medical attention

## 2025-05-23 NOTE — PROGRESS NOTES
Assuming care for pt from MARINA Lynn. Assessment completed, pt rates pain 0/10. Pt declines any needs right now, bed side table and call light within reach. Pt in bed, locked and in lowest position. Bed alarm in place.

## 2025-05-23 NOTE — PROGRESS NOTES
Denies chay hematuria and denies clots in urine   Pain controlled with Tylenol  Discharge home to family care.

## 2025-05-25 ASSESSMENT — ENCOUNTER SYMPTOMS
MYALGIAS: 1
BACK PAIN: 1
HEADACHES: 1